# Patient Record
Sex: MALE | Race: WHITE | NOT HISPANIC OR LATINO
[De-identification: names, ages, dates, MRNs, and addresses within clinical notes are randomized per-mention and may not be internally consistent; named-entity substitution may affect disease eponyms.]

---

## 2020-09-25 PROBLEM — Z00.00 ENCOUNTER FOR PREVENTIVE HEALTH EXAMINATION: Status: ACTIVE | Noted: 2020-09-25

## 2020-10-02 ENCOUNTER — NON-APPOINTMENT (OUTPATIENT)
Age: 50
End: 2020-10-02

## 2020-10-02 ENCOUNTER — APPOINTMENT (OUTPATIENT)
Dept: HEART AND VASCULAR | Facility: CLINIC | Age: 50
End: 2020-10-02
Payer: MEDICAID

## 2020-10-02 VITALS
SYSTOLIC BLOOD PRESSURE: 160 MMHG | BODY MASS INDEX: 29.82 KG/M2 | WEIGHT: 225 LBS | RESPIRATION RATE: 14 BRPM | HEIGHT: 73 IN | HEART RATE: 89 BPM | DIASTOLIC BLOOD PRESSURE: 104 MMHG

## 2020-10-02 VITALS — DIASTOLIC BLOOD PRESSURE: 90 MMHG | SYSTOLIC BLOOD PRESSURE: 140 MMHG

## 2020-10-02 DIAGNOSIS — E11.49 TYPE 2 DIABETES MELLITUS WITH OTHER DIABETIC NEUROLOGICAL COMPLICATION: ICD-10-CM

## 2020-10-02 DIAGNOSIS — Z82.49 FAMILY HISTORY OF ISCHEMIC HEART DISEASE AND OTHER DISEASES OF THE CIRCULATORY SYSTEM: ICD-10-CM

## 2020-10-02 PROCEDURE — 93880 EXTRACRANIAL BILAT STUDY: CPT

## 2020-10-02 PROCEDURE — 93306 TTE W/DOPPLER COMPLETE: CPT

## 2020-10-02 PROCEDURE — 99204 OFFICE O/P NEW MOD 45 MIN: CPT

## 2020-10-02 PROCEDURE — 93000 ELECTROCARDIOGRAM COMPLETE: CPT

## 2020-10-02 RX ORDER — METFORMIN HYDROCHLORIDE 500 MG/1
500 TABLET, FILM COATED ORAL
Refills: 0 | Status: DISCONTINUED | COMMUNITY
End: 2020-10-02

## 2020-10-02 NOTE — ASSESSMENT
[FreeTextEntry1] : Assessment \par Lvef 55% Mild dilated aorta 4.2 cm .. dilated sinus of Valsalva\par Carotid neg \par Risk factor modification discussed \par smoking cessation diet / exercise \par Consider use of ACE in light of recent Dx of AODM\par check for proteinuria \par target lean body wgt \par consider adding AS 81 mg if no contraindication

## 2020-10-02 NOTE — HISTORY OF PRESENT ILLNESS
[FreeTextEntry1] : Patient is a 50-year-old white male with past medical history of hypertension elevated triglycerides(1200 recent 300s) and recently diagnosed with diabetes mellitus.  He has been suffering for some time with lower extremity neuropathy which has limited his ambulation.  Patient is also a 1 pack/day smoker.  Significant family history is notable for a family history of thrombophilia with sisters and brothers who have both had DVTs and large pulmonary embolus some of them fatal in nature.  His sister was tested and has factor IV deficiency he had a work-up for thrombophilia which apparently was negative by report.  Patient had a stress test approximately 2 years ago which was negative for ischemic disease.  He presents for follow-up for known history of mitral valve disease as well as an enlarged aorta.  There is no prior history of dissection in the family or aortic aneurysm disease.

## 2020-10-02 NOTE — PHYSICAL EXAM
[General Appearance - Well Developed] : well developed [Normal Appearance] : normal appearance [Well Groomed] : well groomed [General Appearance - Well Nourished] : well nourished [No Deformities] : no deformities [General Appearance - In No Acute Distress] : no acute distress [Normal Conjunctiva] : the conjunctiva exhibited no abnormalities [Eyelids - No Xanthelasma] : the eyelids demonstrated no xanthelasmas [Normal Oral Mucosa] : normal oral mucosa [No Oral Pallor] : no oral pallor [No Oral Cyanosis] : no oral cyanosis [Normal Jugular Venous A Waves Present] : normal jugular venous A waves present [Normal Jugular Venous V Waves Present] : normal jugular venous V waves present [No Jugular Venous Steven A Waves] : no jugular venous steven A waves [Normal Rate] : normal [Rhythm Regular] : regular [Normal S1] : normal S1 [Normal S2] : normal S2 [II] : a grade 2 [Right Carotid Bruit] : right carotid bruit heard [Left Carotid Bruit] : left carotid bruit heard [Respiration, Rhythm And Depth] : normal respiratory rhythm and effort [Exaggerated Use Of Accessory Muscles For Inspiration] : no accessory muscle use [Auscultation Breath Sounds / Voice Sounds] : lungs were clear to auscultation bilaterally [Abdomen Soft] : soft [Abdomen Tenderness] : non-tender [] : no hepato-splenomegaly [Abdomen Mass (___ Cm)] : no abdominal mass palpated [Abnormal Walk] : normal gait [Gait - Sufficient For Exercise Testing] : the gait was sufficient for exercise testing [Click] : no click [Distant] : the heart sounds were ~L not distant

## 2020-11-20 ENCOUNTER — APPOINTMENT (OUTPATIENT)
Dept: DISASTER EMERGENCY | Facility: CLINIC | Age: 50
End: 2020-11-20

## 2020-11-20 DIAGNOSIS — Z01.818 ENCOUNTER FOR OTHER PREPROCEDURAL EXAMINATION: ICD-10-CM

## 2020-11-23 ENCOUNTER — TRANSCRIPTION ENCOUNTER (OUTPATIENT)
Age: 50
End: 2020-11-23

## 2020-11-24 ENCOUNTER — RESULT REVIEW (OUTPATIENT)
Age: 50
End: 2020-11-24

## 2020-11-24 ENCOUNTER — OUTPATIENT (OUTPATIENT)
Dept: OUTPATIENT SERVICES | Facility: HOSPITAL | Age: 50
LOS: 1 days | Discharge: HOME | End: 2020-11-24
Payer: MEDICAID

## 2020-11-24 LAB — GLUCOSE BLDC GLUCOMTR-MCNC: 127 MG/DL — HIGH (ref 70–99)

## 2020-11-24 PROCEDURE — 88304 TISSUE EXAM BY PATHOLOGIST: CPT | Mod: 26

## 2020-11-24 PROCEDURE — 88311 DECALCIFY TISSUE: CPT | Mod: 26

## 2020-11-25 LAB
GRAM STN FLD: SIGNIFICANT CHANGE UP
GRAM STN FLD: SIGNIFICANT CHANGE UP
SPECIMEN SOURCE: SIGNIFICANT CHANGE UP
SPECIMEN SOURCE: SIGNIFICANT CHANGE UP

## 2020-11-27 LAB
-  CEFAZOLIN: SIGNIFICANT CHANGE UP
-  CEFAZOLIN: SIGNIFICANT CHANGE UP
-  CLINDAMYCIN: SIGNIFICANT CHANGE UP
-  CLINDAMYCIN: SIGNIFICANT CHANGE UP
-  ERYTHROMYCIN: SIGNIFICANT CHANGE UP
-  ERYTHROMYCIN: SIGNIFICANT CHANGE UP
-  LINEZOLID: SIGNIFICANT CHANGE UP
-  LINEZOLID: SIGNIFICANT CHANGE UP
-  OXACILLIN: SIGNIFICANT CHANGE UP
-  OXACILLIN: SIGNIFICANT CHANGE UP
-  RIFAMPIN: SIGNIFICANT CHANGE UP
-  RIFAMPIN: SIGNIFICANT CHANGE UP
-  TRIMETHOPRIM/SULFAMETHOXAZOLE: SIGNIFICANT CHANGE UP
-  TRIMETHOPRIM/SULFAMETHOXAZOLE: SIGNIFICANT CHANGE UP
-  VANCOMYCIN: SIGNIFICANT CHANGE UP
-  VANCOMYCIN: SIGNIFICANT CHANGE UP
CULTURE RESULTS: SIGNIFICANT CHANGE UP
CULTURE RESULTS: SIGNIFICANT CHANGE UP
METHOD TYPE: SIGNIFICANT CHANGE UP
METHOD TYPE: SIGNIFICANT CHANGE UP
ORGANISM # SPEC MICROSCOPIC CNT: SIGNIFICANT CHANGE UP
SPECIMEN SOURCE: SIGNIFICANT CHANGE UP
SPECIMEN SOURCE: SIGNIFICANT CHANGE UP

## 2020-12-02 LAB — SURGICAL PATHOLOGY STUDY: SIGNIFICANT CHANGE UP

## 2021-02-21 LAB — SARS-COV-2 N GENE NPH QL NAA+PROBE: NOT DETECTED

## 2021-07-19 ENCOUNTER — TRANSCRIPTION ENCOUNTER (OUTPATIENT)
Age: 51
End: 2021-07-19

## 2021-07-20 ENCOUNTER — RESULT REVIEW (OUTPATIENT)
Age: 51
End: 2021-07-20

## 2021-07-20 ENCOUNTER — OUTPATIENT (OUTPATIENT)
Dept: OUTPATIENT SERVICES | Facility: HOSPITAL | Age: 51
LOS: 1 days | Discharge: HOME | End: 2021-07-20
Payer: MEDICAID

## 2021-07-20 LAB
GLUCOSE BLDC GLUCOMTR-MCNC: 110 MG/DL — HIGH (ref 70–99)
GRAM STN FLD: SIGNIFICANT CHANGE UP
SPECIMEN SOURCE: SIGNIFICANT CHANGE UP

## 2021-07-20 PROCEDURE — 88305 TISSUE EXAM BY PATHOLOGIST: CPT | Mod: 26

## 2021-07-20 PROCEDURE — 88311 DECALCIFY TISSUE: CPT | Mod: 26

## 2021-07-25 LAB
CULTURE RESULTS: NO GROWTH — SIGNIFICANT CHANGE UP
SPECIMEN SOURCE: SIGNIFICANT CHANGE UP

## 2021-07-28 LAB — SURGICAL PATHOLOGY STUDY: SIGNIFICANT CHANGE UP

## 2021-09-23 ENCOUNTER — RESULT REVIEW (OUTPATIENT)
Age: 51
End: 2021-09-23

## 2021-09-23 ENCOUNTER — INPATIENT (INPATIENT)
Facility: HOSPITAL | Age: 51
LOS: 0 days | Discharge: HOME | DRG: 989 | End: 2021-09-23
Attending: EMERGENCY MEDICINE | Admitting: EMERGENCY MEDICINE
Payer: MEDICAID

## 2021-09-23 VITALS
SYSTOLIC BLOOD PRESSURE: 130 MMHG | RESPIRATION RATE: 18 BRPM | HEART RATE: 79 BPM | DIASTOLIC BLOOD PRESSURE: 82 MMHG | TEMPERATURE: 98 F | OXYGEN SATURATION: 96 %

## 2021-09-23 VITALS
OXYGEN SATURATION: 97 % | HEART RATE: 85 BPM | HEIGHT: 73 IN | TEMPERATURE: 98 F | WEIGHT: 214.95 LBS | SYSTOLIC BLOOD PRESSURE: 172 MMHG | DIASTOLIC BLOOD PRESSURE: 85 MMHG | RESPIRATION RATE: 18 BRPM

## 2021-09-23 DIAGNOSIS — Z89.422 ACQUIRED ABSENCE OF OTHER LEFT TOE(S): Chronic | ICD-10-CM

## 2021-09-23 LAB
ALBUMIN SERPL ELPH-MCNC: 4.5 G/DL — SIGNIFICANT CHANGE UP (ref 3.3–5)
ALP SERPL-CCNC: 83 U/L — SIGNIFICANT CHANGE UP (ref 40–120)
ALT FLD-CCNC: 15 U/L — SIGNIFICANT CHANGE UP (ref 10–45)
ANION GAP SERPL CALC-SCNC: 13 MMOL/L — SIGNIFICANT CHANGE UP (ref 5–17)
APTT BLD: 30.1 SEC — SIGNIFICANT CHANGE UP (ref 27.5–35.5)
AST SERPL-CCNC: 14 U/L — SIGNIFICANT CHANGE UP (ref 10–40)
BASOPHILS # BLD AUTO: 0.04 K/UL — SIGNIFICANT CHANGE UP (ref 0–0.2)
BASOPHILS NFR BLD AUTO: 0.7 % — SIGNIFICANT CHANGE UP (ref 0–2)
BILIRUB SERPL-MCNC: 0.4 MG/DL — SIGNIFICANT CHANGE UP (ref 0.2–1.2)
BLD GP AB SCN SERPL QL: NEGATIVE — SIGNIFICANT CHANGE UP
BUN SERPL-MCNC: 11 MG/DL — SIGNIFICANT CHANGE UP (ref 7–23)
CALCIUM SERPL-MCNC: 9.4 MG/DL — SIGNIFICANT CHANGE UP (ref 8.4–10.5)
CHLORIDE SERPL-SCNC: 105 MMOL/L — SIGNIFICANT CHANGE UP (ref 96–108)
CO2 SERPL-SCNC: 21 MMOL/L — LOW (ref 22–31)
CREAT SERPL-MCNC: 0.83 MG/DL — SIGNIFICANT CHANGE UP (ref 0.5–1.3)
EOSINOPHIL # BLD AUTO: 0.09 K/UL — SIGNIFICANT CHANGE UP (ref 0–0.5)
EOSINOPHIL NFR BLD AUTO: 1.5 % — SIGNIFICANT CHANGE UP (ref 0–6)
GLUCOSE SERPL-MCNC: 136 MG/DL — HIGH (ref 70–99)
HCT VFR BLD CALC: 40.8 % — SIGNIFICANT CHANGE UP (ref 39–50)
HGB BLD-MCNC: 13.6 G/DL — SIGNIFICANT CHANGE UP (ref 13–17)
IMM GRANULOCYTES NFR BLD AUTO: 0.3 % — SIGNIFICANT CHANGE UP (ref 0–1.5)
INR BLD: 0.84 — LOW (ref 0.88–1.16)
LYMPHOCYTES # BLD AUTO: 1.66 K/UL — SIGNIFICANT CHANGE UP (ref 1–3.3)
LYMPHOCYTES # BLD AUTO: 27.8 % — SIGNIFICANT CHANGE UP (ref 13–44)
MCHC RBC-ENTMCNC: 30.6 PG — SIGNIFICANT CHANGE UP (ref 27–34)
MCHC RBC-ENTMCNC: 33.3 GM/DL — SIGNIFICANT CHANGE UP (ref 32–36)
MCV RBC AUTO: 91.7 FL — SIGNIFICANT CHANGE UP (ref 80–100)
MONOCYTES # BLD AUTO: 0.44 K/UL — SIGNIFICANT CHANGE UP (ref 0–0.9)
MONOCYTES NFR BLD AUTO: 7.4 % — SIGNIFICANT CHANGE UP (ref 2–14)
NEUTROPHILS # BLD AUTO: 3.73 K/UL — SIGNIFICANT CHANGE UP (ref 1.8–7.4)
NEUTROPHILS NFR BLD AUTO: 62.3 % — SIGNIFICANT CHANGE UP (ref 43–77)
NRBC # BLD: 0 /100 WBCS — SIGNIFICANT CHANGE UP (ref 0–0)
PLATELET # BLD AUTO: 184 K/UL — SIGNIFICANT CHANGE UP (ref 150–400)
POTASSIUM SERPL-MCNC: 4 MMOL/L — SIGNIFICANT CHANGE UP (ref 3.5–5.3)
POTASSIUM SERPL-SCNC: 4 MMOL/L — SIGNIFICANT CHANGE UP (ref 3.5–5.3)
PROT SERPL-MCNC: 7.3 G/DL — SIGNIFICANT CHANGE UP (ref 6–8.3)
PROTHROM AB SERPL-ACNC: 10.2 SEC — LOW (ref 10.6–13.6)
RBC # BLD: 4.45 M/UL — SIGNIFICANT CHANGE UP (ref 4.2–5.8)
RBC # FLD: 13.7 % — SIGNIFICANT CHANGE UP (ref 10.3–14.5)
RH IG SCN BLD-IMP: POSITIVE — SIGNIFICANT CHANGE UP
RH IG SCN BLD-IMP: POSITIVE — SIGNIFICANT CHANGE UP
SARS-COV-2 RNA SPEC QL NAA+PROBE: NEGATIVE — SIGNIFICANT CHANGE UP
SODIUM SERPL-SCNC: 139 MMOL/L — SIGNIFICANT CHANGE UP (ref 135–145)
WBC # BLD: 5.98 K/UL — SIGNIFICANT CHANGE UP (ref 3.8–10.5)
WBC # FLD AUTO: 5.98 K/UL — SIGNIFICANT CHANGE UP (ref 3.8–10.5)

## 2021-09-23 PROCEDURE — 73719 MRI LOWER EXTREMITY W/DYE: CPT | Mod: ME

## 2021-09-23 PROCEDURE — 73719 MRI LOWER EXTREMITY W/DYE: CPT | Mod: 26,LT

## 2021-09-23 PROCEDURE — 88304 TISSUE EXAM BY PATHOLOGIST: CPT

## 2021-09-23 PROCEDURE — 87075 CULTR BACTERIA EXCEPT BLOOD: CPT

## 2021-09-23 PROCEDURE — 86900 BLOOD TYPING SEROLOGIC ABO: CPT

## 2021-09-23 PROCEDURE — G1004: CPT

## 2021-09-23 PROCEDURE — 86140 C-REACTIVE PROTEIN: CPT

## 2021-09-23 PROCEDURE — A9585: CPT

## 2021-09-23 PROCEDURE — 99285 EMERGENCY DEPT VISIT HI MDM: CPT | Mod: 25

## 2021-09-23 PROCEDURE — 80053 COMPREHEN METABOLIC PANEL: CPT

## 2021-09-23 PROCEDURE — 87070 CULTURE OTHR SPECIMN AEROBIC: CPT

## 2021-09-23 PROCEDURE — 85652 RBC SED RATE AUTOMATED: CPT

## 2021-09-23 PROCEDURE — 88304 TISSUE EXAM BY PATHOLOGIST: CPT | Mod: 26

## 2021-09-23 PROCEDURE — 73630 X-RAY EXAM OF FOOT: CPT

## 2021-09-23 PROCEDURE — 87635 SARS-COV-2 COVID-19 AMP PRB: CPT

## 2021-09-23 PROCEDURE — 73630 X-RAY EXAM OF FOOT: CPT | Mod: 26

## 2021-09-23 PROCEDURE — 88311 DECALCIFY TISSUE: CPT | Mod: 26

## 2021-09-23 PROCEDURE — 86901 BLOOD TYPING SEROLOGIC RH(D): CPT

## 2021-09-23 PROCEDURE — 86850 RBC ANTIBODY SCREEN: CPT

## 2021-09-23 PROCEDURE — 73630 X-RAY EXAM OF FOOT: CPT | Mod: 26,LT

## 2021-09-23 PROCEDURE — 85730 THROMBOPLASTIN TIME PARTIAL: CPT

## 2021-09-23 PROCEDURE — 96374 THER/PROPH/DIAG INJ IV PUSH: CPT | Mod: XU

## 2021-09-23 PROCEDURE — 36415 COLL VENOUS BLD VENIPUNCTURE: CPT

## 2021-09-23 PROCEDURE — 88311 DECALCIFY TISSUE: CPT

## 2021-09-23 PROCEDURE — 85025 COMPLETE CBC W/AUTO DIFF WBC: CPT

## 2021-09-23 PROCEDURE — 85610 PROTHROMBIN TIME: CPT

## 2021-09-23 RX ORDER — VANCOMYCIN HCL 1 G
1500 VIAL (EA) INTRAVENOUS ONCE
Refills: 0 | Status: COMPLETED | OUTPATIENT
Start: 2021-09-23 | End: 2021-09-23

## 2021-09-23 RX ORDER — MUPIROCIN 20 MG/G
1 OINTMENT TOPICAL
Qty: 1 | Refills: 3
Start: 2021-09-23 | End: 2022-01-20

## 2021-09-23 RX ORDER — LIDOCAINE HCL 20 MG/ML
20 VIAL (ML) INJECTION ONCE
Refills: 0 | Status: COMPLETED | OUTPATIENT
Start: 2021-09-23 | End: 2021-09-23

## 2021-09-23 RX ORDER — AZTREONAM 2 G
1 VIAL (EA) INJECTION
Qty: 20 | Refills: 0
Start: 2021-09-23 | End: 2021-10-02

## 2021-09-23 RX ORDER — VANCOMYCIN HCL 1 G
1000 VIAL (EA) INTRAVENOUS ONCE
Refills: 0 | Status: DISCONTINUED | OUTPATIENT
Start: 2021-09-23 | End: 2021-09-23

## 2021-09-23 RX ADMIN — Medication 20 MILLILITER(S): at 11:52

## 2021-09-23 RX ADMIN — Medication 300 MILLIGRAM(S): at 12:46

## 2021-09-23 NOTE — ED PROVIDER NOTE - OBJECTIVE STATEMENT
52 y/o M PMHx DM, left foot distal phalanx third toe osteomyelitis s/p partial amputation of the left third toe with proximal margin. Sent in by Dr. Fine (podiatrist) for left digit imaging study consistent with osteomyelitis. 52 y/o M PMHx DM, left foot distal phalanx third toe osteomyelitis s/p partial amputation of the left third toe with proximal margin. Sent in by Dr. Fine (podiatrist) for left 4th digit imaging study consistent with osteomyelitis. Pt notes yellow discharge from the toe at home. 50 y/o M PMHx DM, left foot distal phalanx third toe osteomyelitis s/p partial amputation of the left third toe with proximal margin. Sent in by Dr. Fine (podiatrist) for left 4th digit imaging study consistent with osteomyelitis. Pt notes yellow discharge from the toe at home.  Pt is vaccinated for Covid as of April 2021 with Pfizer.

## 2021-09-23 NOTE — ED PROVIDER NOTE - PATIENT PORTAL LINK FT
You can access the FollowMyHealth Patient Portal offered by [facility] by registering at the following website: [email or web address]. By joining [facility]’s FollowMyHealth portal, you will also be able to view your health information using other applications (apps) compatible with our system.

## 2021-09-23 NOTE — CONSULT NOTE ADULT - ASSESSMENT
50yo M w/ PMHx B12 deficiency c/b neuropathy, HTN, HLD, DM Type 2 (diagnosed August 2021, last known 7/2021 Hgb A1c 5.5%), left foot osteomyelitis s/p amputation of distal L 2nd toe (11/2020) and amputation of distal L 3rd toe (07/2021) sent to [facility] ER by podiatrist Dr. Nae Bentley to r/o osteomyelitis of left 4th digit wound.     P:   Admit to Medicine for IV abx + r/o OM via MRI   L foot XR evaluated; unremarkable for signs consistent w/ OM   Ordered L foot MRI w/ contrast   Injected 8cc of 1% lidocaine plain in a digital block fashion. Small 0.5cm incision made to distal tip of L 4th digit to allow for passing of Jamshidi needle to obtain bone biopsy; specimen sent to microbiology and pathology. Pt tolerated bedside procedure w/o AE.   Dry sterile dressing to L foot   Start Vancomycin IV; can de-escalate pending results of bone cx & path       Podiatry following  50yo M w/ PMHx B12 deficiency c/b neuropathy, HTN, HLD, DM Type 2 (diagnosed August 2021, last known 7/2021 Hgb A1c 5.5%), left foot osteomyelitis s/p amputation of distal L 2nd toe (11/2020) and amputation of distal L 3rd toe (07/2021) sent to [facility] ER by podiatrist Dr. Nae Bentley to r/o osteomyelitis of left 4th digit wound.     P:   Admit to Medicine for IV abx + r/o OM via MRI   L foot XR evaluated; unremarkable for signs consistent w/ OM   Ordered L foot MRI w/ contrast   Injected 8cc of 1% lidocaine plain in a digital block fashion. Small 0.5cm incision made to distal tip of L 4th digit (dorsal incision in relation to plantar wound) to allow for passing of Jamshidi needle to obtain bone biopsy; specimen sent to microbiology and pathology. Pt tolerated bedside procedure w/o AE.   Dry sterile dressing to L foot   Start Vancomycin IV; can de-escalate pending results of bone cx & path       Podiatry following  52yo M w/ PMHx B12 deficiency c/b neuropathy, HTN, HLD, DM Type 2 (diagnosed August 2021, last known 7/2021 Hgb A1c 5.5%), left foot osteomyelitis s/p amputation of distal L 2nd toe (11/2020) and amputation of distal L 3rd toe (07/2021) sent to [facility] ER by podiatrist Dr. Nae Bentley to r/o osteomyelitis of left 4th digit wound.     P:   Admit to Medicine for IV abx + r/o OM via MRI   L foot XR evaluated; unremarkable for signs consistent w/ OM   Ordered L foot MRI w/ contrast   Injected 8cc of 1% lidocaine plain in a digital block fashion. Small 0.5cm incision made to distal tip of L 4th digit (dorsal incision in relation to plantar wound) to allow for passing of Jamshidi needle to obtain bone biopsy; specimen sent to microbiology and pathology. Pt tolerated bedside procedure w/o AE.   As L 4th digit wound is superficial, no deep wound cx obtained.   Dry sterile dressing to L foot   Start Vancomycin IV; can de-escalate pending results of bone cx & path       Podiatry following  50yo M w/ PMHx B12 deficiency c/b neuropathy, HTN, HLD, DM Type 2 (diagnosed August 2021, last known 7/2021 Hgb A1c 5.5%), left foot osteomyelitis s/p amputation of distal L 2nd toe (11/2020) and amputation of distal L 3rd toe (07/2021) sent to [facility] ER by podiatrist Dr. Nae Bentley to r/o osteomyelitis of left 4th digit wound.     P:   Does not warrant admission at this time   L foot XR evaluated; unremarkable for signs consistent w/ OM   Recommend getting a L foot MRI outpatient   Injected 8cc of 1% lidocaine plain in a digital block fashion. Small 0.5cm incision made to distal tip of L 4th digit (dorsal incision in relation to plantar wound) to allow for passing of Jamshidi needle to obtain bone biopsy; specimen sent to microbiology and pathology. Pt tolerated bedside procedure w/o AE.   As L 4th digit wound is superficial, no deep wound cx obtained.   Will f/u microbiology and pathology results of L 4th digit bone   Dry sterile dressing to L foot   RX: PO Bactrim DS 1 tablet BID for 10 days   RX : Mupirocin topical ointment to be applied daily to wound on L 4th digit   F/u w/ Dr. Nae Bentley on Friday 10/1/21 at 1:30PM        50yo M w/ PMHx B12 deficiency c/b neuropathy, HTN, HLD, DM Type 2 (diagnosed August 2021, last known 7/2021 Hgb A1c 5.5%), left foot osteomyelitis s/p amputation of distal L 2nd toe (11/2020) and amputation of distal L 3rd toe (07/2021) sent to [facility] ER by podiatrist Dr. Nae Bentley to r/o osteomyelitis of left 4th digit wound.     P:   Does not warrant admission at this time   L foot XR evaluated; unremarkable for signs consistent w/ OM   Recommend getting a L foot MRI outpatient - *will try to get MRI done while in ED; can be discharged after MRI is performed. Podiatry will f/u results as outpatient*  Injected 8cc of 1% lidocaine plain in a digital block fashion. Small 0.5cm incision made to distal tip of L 4th digit (dorsal incision in relation to plantar wound) to allow for passing of Jamshidi needle to obtain bone biopsy; specimen sent to microbiology and pathology. Pt tolerated bedside procedure w/o AE.   As L 4th digit wound is superficial, no deep wound cx obtained.   Will f/u microbiology and pathology results of L 4th digit bone   Dry sterile dressing to L foot   RX: PO Bactrim DS 1 tablet BID for 10 days   RX : Mupirocin topical ointment to be applied daily to wound on L 4th digit     Please follow-up in outpatient podiatry clinic with Dr. Nae Bentley on Friday 10/1 @ 1:30PM:   [facility]  [address] [place] [address], [address] [place] [address]  [phone]

## 2021-09-23 NOTE — ED PROVIDER NOTE - MUSCULOSKELETAL, MLM
Spine appears normal, range of motion is not limited, no muscle or joint tenderness Spine appears normal, range of motion is not limited, no muscle or joint tenderness. 1cm x 1cm ulcer noted to the left 4th digit. Podiatry resident changed cleaned and changed dressing. Rest of foot exam deferred to podiatry.

## 2021-09-23 NOTE — ED PROVIDER NOTE - CLINICAL SUMMARY MEDICAL DECISION MAKING FREE TEXT BOX
50 y/o M PMHx DM, left foot distal phalanx third toe osteomyelitis s/p partial amputation of the left third toe with proximal margin. Sent to ED by podiatrist Dr. Fine for evaluation of osteomyelitis of the left 3rd digit. Dr. Fine will see Pt at bedside in ED. Will start Pt on IV abx. Anticipate admission. 50 y/o M PMHx DM, left foot distal phalanx third toe osteomyelitis s/p partial amputation of the left third toe with proximal margin. Sent to ED by podiatrist Dr. Fine for evaluation of osteomyelitis of the left 4rd digit found in outpatient imaging. Dr. Fine will see Pt at bedside in ED. Will start Pt on IV abx. Anticipate admission. 52 y/o M PMHx DM, left foot distal phalanx third toe osteomyelitis s/p partial amputation of the left third toe with proximal margin. Sent to ED by podiatrist Dr. Fine for evaluation of osteomyelitis of the left 4rd digit found in outpatient imaging. Dr. Fine will see Pt at bedside in ED. Per Podiatry, Pt to be admitted for biopsy of the left 4th digit bone. Will start on IV Vancomycin. 52 y/o M PMHx DM, left foot distal phalanx third toe osteomyelitis s/p partial amputation of the left third toe with proximal margin. Sent to ED by podiatrist Dr. Fine for evaluation of osteomyelitis of the left 4rd digit found in outpatient imaging. Dr. Fine will see Pt at bedside in ED. Per Podiatry, Pt to be admitted for biopsy of the left 4th digit bone. Will start on IV Vancomycin. Wiil require inpatient

## 2021-09-23 NOTE — CONSULT NOTE ADULT - SUBJECTIVE AND OBJECTIVE BOX
Attending: Norris Gandhi DPM     Patient is a 51y old  Male who presents with a chief complaint of L 4th digit wound     HPI: 52yo M w/ PMHx B12 deficiency c/b neuropathy, HTN, HLD, DM Type 2 (diagnosed August 2021, last known 7/2021 Hgb A1c 5.5%), left foot osteomyelitis s/p amputation of distal L 2nd toe (11/2020) and amputation of distal L 3rd toe (07/2021) sent to [facility] ER by podiatrist Dr. Nae Bentley to r/o osteomyelitis of left 4th digit wound. Pt states that wound has been present for approx 3-4 weeks. Goes to [facility] podiatry clinic in [place] every week to have the wound evaluated + debrided. Endorses occasional "yellowish" drainage visible on bandage. Pt cleanses foot daily, applied mupirocin, covered by a dry dressing. Pt has been using the surgical shoe on the left foot for the past week; uses cane as well.       Review of systems negative except per HPI and as stated below  General:	 no weakness; no fevers, no chills  Skin/Breast: no rash  Respiratory and Thorax: no SOB, no cough  Cardiovascular:	No chest pain  Gastrointestinal:	 no nausea, vomiting , diarrhea  Genitourinary:	no dysuria, no difficulty urinating, no hematuria  Musculoskeletal:	no weakness, no joint swelling/pain  Neurological:	no focal weakness/numbness  Endocrine:	no polyuria, no polydipsia    PAST MEDICAL & SURGICAL HISTORY:    Home Medications:    Allergies    No Known Allergies    Intolerances      FAMILY HISTORY:    Social History:       LABS                        13.6   5.98  )-----------( 184      ( 23 Sep 2021 10:58 )             40.8     09-23    139  |  105  |  11  ----------------------------<  136<H>  4.0   |  21<L>  |  0.83    Ca    9.4      23 Sep 2021 10:58    TPro  7.3  /  Alb  4.5  /  TBili  0.4  /  DBili  x   /  AST  14  /  ALT  15  /  AlkPhos  83  09-23    PT/INR - ( 23 Sep 2021 10:58 )   PT: 10.2 sec;   INR: 0.84          PTT - ( 23 Sep 2021 10:58 )  PTT:30.1 sec    Vital Signs Last 24 Hrs  T(C): 36.4 (23 Sep 2021 12:18), Max: 36.9 (23 Sep 2021 10:11)  T(F): 97.5 (23 Sep 2021 12:18), Max: 98.5 (23 Sep 2021 10:11)  HR: 80 (23 Sep 2021 12:18) (80 - 85)  BP: 130/73 (23 Sep 2021 12:18) (130/73 - 172/85)  BP(mean): --  RR: 18 (23 Sep 2021 12:18) (18 - 18)  SpO2: 96% (23 Sep 2021 12:18) (96% - 97%)    PHYSICAL EXAM  General: NAD, AA0x3    Lower Extremity Focused:  Vasc:  Derm:  Neuro:  MSK:     RADIOLOGY                       Attending: Norris Gandhi DPM     Patient is a 51y old  Male who presents with a chief complaint of L 4th digit wound     HPI: 52yo M w/ PMHx B12 deficiency c/b neuropathy, HTN, HLD, DM Type 2 (diagnosed August 2021, last known 7/2021 Hgb A1c 5.5%), left foot osteomyelitis s/p amputation of distal L 2nd toe (11/2020) and amputation of distal L 3rd toe (07/2021) sent to [facility] ER by podiatrist Dr. Nae Bentley to r/o osteomyelitis of left 4th digit wound. Pt states that wound has been present for approx 3-4 weeks. Goes to [facility] podiatry clinic in [place] every week to have the wound evaluated + debrided. Endorses occasional "yellowish" drainage visible on bandage. Pt cleanses foot daily, applied mupirocin, covered by a dry dressing. Pt has been using the surgical shoe on the left foot for the past week; uses cane as well.       Review of systems negative except per HPI and as stated below  General:	 no weakness; no fevers, no chills  Skin/Breast: no rash  Respiratory and Thorax: no SOB, no cough  Cardiovascular:	No chest pain  Gastrointestinal:	 no nausea, vomiting , diarrhea  Genitourinary:	no dysuria, no difficulty urinating, no hematuria  Musculoskeletal:	no weakness, no joint swelling/pain  Neurological:	no focal weakness/numbness  Endocrine:	no polyuria, no polydipsia    PAST MEDICAL & SURGICAL HISTORY:    Home Medications:    Allergies    No Known Allergies    Intolerances        LABS                        13.6   5.98  )-----------( 184      ( 23 Sep 2021 10:58 )             40.8     09-23    139  |  105  |  11  ----------------------------<  136<H>  4.0   |  21<L>  |  0.83    Ca    9.4      23 Sep 2021 10:58    TPro  7.3  /  Alb  4.5  /  TBili  0.4  /  DBili  x   /  AST  14  /  ALT  15  /  AlkPhos  83  09-23    PT/INR - ( 23 Sep 2021 10:58 )   PT: 10.2 sec;   INR: 0.84          PTT - ( 23 Sep 2021 10:58 )  PTT:30.1 sec    Vital Signs Last 24 Hrs  T(C): 36.4 (23 Sep 2021 12:18), Max: 36.9 (23 Sep 2021 10:11)  T(F): 97.5 (23 Sep 2021 12:18), Max: 98.5 (23 Sep 2021 10:11)  HR: 80 (23 Sep 2021 12:18) (80 - 85)  BP: 130/73 (23 Sep 2021 12:18) (130/73 - 172/85)  BP(mean): --  RR: 18 (23 Sep 2021 12:18) (18 - 18)  SpO2: 96% (23 Sep 2021 12:18) (96% - 97%)    PHYSICAL EXAM  General: NAD, AA0x3    Lower Extremity Focused:  Vasc: DP/PT 2/4 b/l. WWP. TG warm to cool. No edema. Minimal erythema to dorsal surface of left 4th digit.   Derm: Rendon Grade 1 ulceration to distal tip of contracted left 4th digit, measures approx 1cm x 1cm x 0.1cm depth. Does not probe to bone. No drainage/purulence expressed. No open wounds/abrasions present to R foot.   Neuro: Protective sensation diminished 2/2 B12 deficiency & DM Type 2  MSK: 5/5 muscle strength in all crural compartments b/l. Hammertoe deformity of L 4th and 5th digits. Distal ends of L 2nd and 3rd digits amputated. Ambulates independently w/ assistance of cane.     RADIOLOGY  < from: Xray Foot AP + Lateral + Oblique, Left (09.23.21 @ 11:40) >  FINDINGS:    There is no fracture or dislocation. Patient is status post partial indentation of the second and third rays at the level of the middle phalangeal heads. Normal appearance of the stump margins, without evidence for osteomyelitis.  There is no focal soft tissue abnormality.      IMPRESSION:    No radiographic evidence for acute osteomyelitis.    < end of copied text >                       Attending: Norris Gandhi DPM     Patient is a 51y old  Male who presents with a chief complaint of L 4th digit wound     HPI: 50yo M w/ PMHx B12 deficiency c/b neuropathy, cataract surgery, HTN, HLD, DM Type 2 (diagnosed August 2021, last known 7/2021 Hgb A1c 5.5%), left foot osteomyelitis s/p amputation of distal L 2nd toe (11/2020) and amputation of distal L 3rd toe (07/2021) sent to [facility] ER by podiatrist Dr. Nae Bentley to r/o osteomyelitis of left 4th digit wound. Pt states that wound has been present for approx 3-4 weeks. Goes to [facility] podiatry clinic in [place] every week to have the wound evaluated + debrided. Endorses occasional "yellowish" drainage visible on bandage. Pt cleanses foot daily, applied mupirocin, covered by a dry dressing. Pt has been using the surgical shoe on the left foot for the past week; uses cane as well. Denies F/N/V/C/CP/SOB.       Review of systems negative except per HPI and as stated below  General:	 no weakness; no fevers, no chills  Skin/Breast: no rash  Respiratory and Thorax: no SOB, no cough  Cardiovascular:	No chest pain  Gastrointestinal:	 no nausea, vomiting , diarrhea  Genitourinary:	no dysuria, no difficulty urinating, no hematuria  Musculoskeletal:	no weakness, no joint swelling/pain  Neurological:	no focal weakness/numbness  Endocrine:	no polyuria, no polydipsia      LABS                        13.6   5.98  )-----------( 184      ( 23 Sep 2021 10:58 )             40.8     09-23    139  |  105  |  11  ----------------------------<  136<H>  4.0   |  21<L>  |  0.83    Ca    9.4      23 Sep 2021 10:58    TPro  7.3  /  Alb  4.5  /  TBili  0.4  /  DBili  x   /  AST  14  /  ALT  15  /  AlkPhos  83  09-23    PT/INR - ( 23 Sep 2021 10:58 )   PT: 10.2 sec;   INR: 0.84          PTT - ( 23 Sep 2021 10:58 )  PTT:30.1 sec    Vital Signs Last 24 Hrs  T(C): 36.4 (23 Sep 2021 12:18), Max: 36.9 (23 Sep 2021 10:11)  T(F): 97.5 (23 Sep 2021 12:18), Max: 98.5 (23 Sep 2021 10:11)  HR: 80 (23 Sep 2021 12:18) (80 - 85)  BP: 130/73 (23 Sep 2021 12:18) (130/73 - 172/85)  BP(mean): --  RR: 18 (23 Sep 2021 12:18) (18 - 18)  SpO2: 96% (23 Sep 2021 12:18) (96% - 97%)    PHYSICAL EXAM  General: NAD, AA0x3    Lower Extremity Focused:  Vasc: DP/PT 2/4 b/l. WWP. TG warm to cool. No edema. Minimal erythema to dorsal surface of left 4th digit.   Derm: Rendon Grade 1 ulceration to distal tip of contracted left 4th digit, measures approx 1cm x 1cm x 0.1cm depth. Does not probe to bone. No drainage/purulence expressed. No open wounds/abrasions present to R foot.   Neuro: Protective sensation diminished 2/2 B12 deficiency & DM Type 2  MSK: 5/5 muscle strength in all crural compartments b/l. Hammertoe deformity of L 4th and 5th digits. Distal ends of L 2nd and 3rd digits amputated. Ambulates independently w/ assistance of cane.     RADIOLOGY  < from: Xray Foot AP + Lateral + Oblique, Left (09.23.21 @ 11:40) >  FINDINGS:    There is no fracture or dislocation. Patient is status post partial indentation of the second and third rays at the level of the middle phalangeal heads. Normal appearance of the stump margins, without evidence for osteomyelitis.  There is no focal soft tissue abnormality.      IMPRESSION:    No radiographic evidence for acute osteomyelitis.    < end of copied text >

## 2021-09-23 NOTE — ED PROVIDER NOTE - NSFOLLOWUPINSTRUCTIONS_ED_ALL_ED_FT
Osteomyelitis, Adult       Bone infections, also called osteomyelitis,occur when bacteria or other germs get inside a bone. This can happen if you have an infection in another part of your body that spreads through your blood. It can also happen if you have a wound or a broken bone (fracture) that breaks the skin. A wound or a fracture can allow germs from your skin or from outside of your body to spread to your bone.  Bone infections need to be treated quickly to:  •Prevent bone damage.      •Prevent the infection from spreading to other areas of your body.        What are the causes?    Most bone infections are caused by bacteria. The most common bacteria is one found on the skin (staphylococcus). Bone infections can also be caused by other germs, such as viruses and funguses.      What increases the risk?  You are more likely to develop this condition if:  •You recently had surgery, especially bone or joint surgery.      •You have had an injury, such as stepping on a nail or having a fracture that exposes bones through the skin.    •You have a long-term (chronic) disease, such as:  •Diabetes.      •HIV (human immunodeficiency virus).      •Rheumatoid arthritis.      •Sickle cell anemia.      •Kidney disease that requires dialysis.        •You have a condition that affects your body's defense system (immune system) or you take medicines that block or weaken the immune system.      •You have a condition that reduces your blood flow.      •You have an artificial joint.      •You have had a joint or bone repaired with plates or screws.      •You use IV drugs.        What are the signs or symptoms?  Symptoms vary depending on the type and location of your infection. Common symptoms of bone infections include:  •Fever and chills.      •Skin redness and warmth.      •Swelling.      •Pain and stiffness.      •Drainage of fluid or pus near the infection.        How is this diagnosed?  This condition may be diagnosed based on:  •Your symptoms and medical history.      •A physical exam.    •Tests, such as:  •A sample of tissue, fluid, or blood taken to be examined under a microscope.      •Pus or discharge swabbed from a wound for testing. This is to identify the type of germs and to determine what type of medicine will kill them.      •Blood tests.        •Imaging studies, including X-rays, MRI, CT scan, bone scan, or ultrasound.        How is this treated?    Treatment for this condition depends on the cause and type of infection. Antibiotic medicines are usually the first treatment for a bone infection. This may be done in a hospital at first. You may have to continue IV antibiotics at home or take antibiotics by mouth for several weeks after that.  Other treatments may include surgery to:  •Remove dead or dying tissue from a bone.      •Remove an infected artificial joint.      •Remove infected plates or screws that were used to repair a broken bone.        Follow these instructions at home:    Medicines     •Take over-the-counter and prescription medicines as told by your health care provider. Finish all antibiotic medicine even if you start to feel better.      •Follow instructions from your health care provider about how to take IV antibiotics at home. You may need to have a nurse come to your home to give you the IV antibiotics.        Managing pain, stiffness, and swelling   If directed, put ice on the affected area. To do this:  •Put ice in a plastic bag.      •Place a towel between your skin and the bag.      •Leave the ice on for 20 minutes, 2–3 times a day.      General instructions     •Ask your health care provider if you have any restrictions on your activities.      • Do not use any products that contain nicotine or tobacco, such as cigarettes, e-cigarettes, and chewing tobacco. If you need help quitting, ask your health care provider.      •Keep all follow-up visits as told by your health care provider. This is important.        How is this prevented?    •Wash your hands often to stop the spread of germs. Wash your hands for at least 20 seconds with soap and water. If soap and water are not available, use hand .      •Keep any open areas, cuts, or wounds clean. Apply a clean bandage after cleaning the area.      •Check wounds frequently for signs of infections. Signs of infection include redness, swelling, warmth, pus, or a bad smell.      •Wear proper footwear to avoid injuries to the feet.        Contact a health care provider if:    •You develop a fever or chills.      •You have redness, warmth, pain, or swelling that returns after treatment.        Get help right away if:    •You have rapid breathing or you have trouble breathing.      •You have chest pain.      •You cannot drink fluids or make urine.      •The affected area swells, changes color, or turns blue.      •You have numbness or severe pain in the affected area.      These symptoms may represent a serious problem that is an emergency. Do not wait to see if the symptoms will go away. Get medical help right away. Call your local emergency services (911 in the [place] Do not drive yourself to the hospital.       Summary    •Bone infections, also called osteomyelitis,occur when bacteria or other germs get inside a bone.      •You are more likely to get this type of infection if you have a condition that lowers your ability to fight infections. You are also likely to get this condition if you take medicines that block or weaken the immune system.      •Most bone infections are caused by bacteria. They can also be caused by other germs, such as viruses and funguses.      •Treatment for this condition usually starts with taking antibiotics. Further treatment depends on the cause and type of infection.      This information is not intended to replace advice given to you by your health care provider. Make sure you discuss any questions you have with your health care provider.

## 2021-09-23 NOTE — ED ADULT NURSE NOTE - NSIMPLEMENTINTERV_GEN_ALL_ED
Implemented All Fall Risk Interventions:  [place] to call system. Call bell, personal items and telephone within reach. Instruct patient to call for assistance. Room bathroom lighting operational. Non-slip footwear when patient is off stretcher. Physically safe environment: no spills, clutter or unnecessary equipment. Stretcher in lowest position, wheels locked, appropriate side rails in place. Provide visual cue, wrist band, yellow gown, etc. Monitor gait and stability. Monitor for mental status changes and reorient to person, place, and time. Review medications for side effects contributing to fall risk. Reinforce activity limits and safety measures with patient and family.

## 2021-09-23 NOTE — ED ADULT NURSE NOTE - OBJECTIVE STATEMENT
pt presented to the ED from home complaining of toe pain/ infection. pt denies fever or chills. told by podiatrist to come for further evaluation. pt ambulates with a boot and straight cane. labs drawn, X ray completed, Family at the bedside. pt makes his needs known.

## 2021-09-24 LAB — SURGICAL PATHOLOGY STUDY: SIGNIFICANT CHANGE UP

## 2021-09-28 DIAGNOSIS — Z89.422 ACQUIRED ABSENCE OF OTHER LEFT TOE(S): ICD-10-CM

## 2021-09-28 DIAGNOSIS — M79.675 PAIN IN LEFT TOE(S): ICD-10-CM

## 2021-09-28 DIAGNOSIS — E11.621 TYPE 2 DIABETES MELLITUS WITH FOOT ULCER: ICD-10-CM

## 2021-09-28 DIAGNOSIS — L97.529 NON-PRESSURE CHRONIC ULCER OF OTHER PART OF LEFT FOOT WITH UNSPECIFIED SEVERITY: ICD-10-CM

## 2021-09-28 DIAGNOSIS — I10 ESSENTIAL (PRIMARY) HYPERTENSION: ICD-10-CM

## 2021-09-28 DIAGNOSIS — E53.8 DEFICIENCY OF OTHER SPECIFIED B GROUP VITAMINS: ICD-10-CM

## 2021-09-28 DIAGNOSIS — E11.9 TYPE 2 DIABETES MELLITUS WITHOUT COMPLICATIONS: ICD-10-CM

## 2021-11-08 PROBLEM — M86.9 OSTEOMYELITIS, UNSPECIFIED: Chronic | Status: ACTIVE | Noted: 2021-09-23

## 2021-11-10 DIAGNOSIS — R09.89 OTHER SPECIFIED SYMPTOMS AND SIGNS INVOLVING THE CIRCULATORY AND RESPIRATORY SYSTEMS: ICD-10-CM

## 2021-11-11 ENCOUNTER — NON-APPOINTMENT (OUTPATIENT)
Age: 51
End: 2021-11-11

## 2021-11-11 ENCOUNTER — APPOINTMENT (OUTPATIENT)
Dept: HEART AND VASCULAR | Facility: CLINIC | Age: 51
End: 2021-11-11
Payer: MEDICAID

## 2021-11-11 VITALS
SYSTOLIC BLOOD PRESSURE: 132 MMHG | BODY MASS INDEX: 29.03 KG/M2 | WEIGHT: 219 LBS | HEART RATE: 99 BPM | DIASTOLIC BLOOD PRESSURE: 90 MMHG | HEIGHT: 73 IN

## 2021-11-11 PROCEDURE — 93000 ELECTROCARDIOGRAM COMPLETE: CPT

## 2021-11-11 PROCEDURE — ZZZZZ: CPT

## 2021-11-11 PROCEDURE — 99214 OFFICE O/P EST MOD 30 MIN: CPT | Mod: 25

## 2021-11-11 PROCEDURE — 93306 TTE W/DOPPLER COMPLETE: CPT

## 2021-11-14 NOTE — HISTORY OF PRESENT ILLNESS
[FreeTextEntry1] : 10/2/2020     Patient is a 50-year-old white male with past medical history of hypertension elevated triglycerides(1200 recent 300s) and recently diagnosed with diabetes mellitus.  He has been suffering for some time with lower extremity neuropathy which has limited his ambulation.  Patient is also a 1 pack/day smoker.  Significant family history is notable for a family history of thrombophilia with sisters and brothers who have both had DVTs and large pulmonary embolus some of them fatal in nature.  His sister was tested and has factor IV deficiency he had a work-up for thrombophilia which apparently was negative by report.  Patient had a stress test approximately 2 years ago which was negative for ischemic disease.  He presents for follow-up for known history of mitral valve disease as well as an enlarged aorta.  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

## 2023-04-25 DIAGNOSIS — I34.0 NONRHEUMATIC MITRAL (VALVE) INSUFFICIENCY: ICD-10-CM

## 2023-05-04 ENCOUNTER — APPOINTMENT (OUTPATIENT)
Dept: HEART AND VASCULAR | Facility: CLINIC | Age: 53
End: 2023-05-04
Payer: MEDICAID

## 2023-05-04 ENCOUNTER — NON-APPOINTMENT (OUTPATIENT)
Age: 53
End: 2023-05-04

## 2023-05-04 VITALS
SYSTOLIC BLOOD PRESSURE: 138 MMHG | RESPIRATION RATE: 14 BRPM | HEIGHT: 73 IN | WEIGHT: 217 LBS | HEART RATE: 78 BPM | BODY MASS INDEX: 28.76 KG/M2 | DIASTOLIC BLOOD PRESSURE: 95 MMHG

## 2023-05-04 DIAGNOSIS — I77.810 THORACIC AORTIC ECTASIA: ICD-10-CM

## 2023-05-04 DIAGNOSIS — F17.210 NICOTINE DEPENDENCE, CIGARETTES, UNCOMPLICATED: ICD-10-CM

## 2023-05-04 DIAGNOSIS — E78.1 PURE HYPERGLYCERIDEMIA: ICD-10-CM

## 2023-05-04 DIAGNOSIS — I10 ESSENTIAL (PRIMARY) HYPERTENSION: ICD-10-CM

## 2023-05-04 PROCEDURE — 93000 ELECTROCARDIOGRAM COMPLETE: CPT

## 2023-05-04 PROCEDURE — 99214 OFFICE O/P EST MOD 30 MIN: CPT | Mod: 25

## 2023-05-04 PROCEDURE — 93306 TTE W/DOPPLER COMPLETE: CPT

## 2023-05-04 RX ORDER — AMLODIPINE BESYLATE 5 MG/1
5 TABLET ORAL
Qty: 90 | Refills: 3 | Status: ACTIVE | COMMUNITY

## 2023-05-04 RX ORDER — FOLIC ACID 1 MG/1
1 TABLET ORAL
Refills: 0 | Status: ACTIVE | COMMUNITY

## 2023-05-04 RX ORDER — METFORMIN HYDROCHLORIDE 500 MG/1
500 TABLET, COATED ORAL
Qty: 180 | Refills: 5 | Status: ACTIVE | COMMUNITY
Start: 2020-10-02

## 2023-05-04 RX ORDER — GEMFIBROZIL 600 MG/1
600 TABLET, FILM COATED ORAL TWICE DAILY
Qty: 60 | Refills: 3 | Status: ACTIVE | COMMUNITY

## 2023-05-04 NOTE — PHYSICAL EXAM
[Well Developed] : well developed [Well Nourished] : well nourished [No Acute Distress] : no acute distress [Normal Conjunctiva] : normal conjunctiva [Normal Venous Pressure] : normal venous pressure [No Carotid Bruit] : no carotid bruit [Normal Rate] : normal [Rhythm Regular] : regular [Normal S1] : normal S1 [Normal S2] : normal S2 [II] : a grade 2 [Right Carotid Bruit] : right carotid bruit heard [Left Carotid Bruit] : left carotid bruit heard [Clear Lung Fields] : clear lung fields [Good Air Entry] : good air entry [No Respiratory Distress] : no respiratory distress  [Soft] : abdomen soft [Non Tender] : non-tender [No Masses/organomegaly] : no masses/organomegaly [Normal Bowel Sounds] : normal bowel sounds [Normal Gait] : normal gait [Gait - Sufficient for Exercise Testing] : gait - sufficient for exercise testing [Click] : no click [Distant] : the heart sounds were ~L not distant

## 2023-05-04 NOTE — ADDENDUM
[FreeTextEntry1] : I, Puneet Knight, assisted in documentation on 05/04/2023 acting as a scribe for Dr. Nash Taylor.\par \par

## 2023-05-04 NOTE — DISCUSSION/SUMMARY
[Hypertriglyceridemia] : essential hypertriglyceridemia [Stable] : stable [Continue] : continuing fibric acid derivative [Diet Modification] : diet modification [Smoking Cessation] : smoking cessation [Exercise] : exercise [Patient] : the patient [FreeTextEntry1] : Dilated TAA no new changes \par Ct chest pending

## 2023-05-04 NOTE — ASSESSMENT
[FreeTextEntry1] : Lvef 55% Mild dilated aorta 4.0-4.2 cm.. dilated sinus of Valsalva\par \par Risk factor modification discussed \par smoking cessation diet / exercise \par Consider use of ACE in light of recent Dx of AODM\par UA neg for protien \par \par target lean body wgt \par consider adding AS 81 mg if no contraindication. \par

## 2023-05-04 NOTE — HISTORY OF PRESENT ILLNESS
[FreeTextEntry1] : 10/2/2020 Patient is a 50-year-old white male with past medical history of hypertension elevated triglycerides(1200 recent 300s) and recently diagnosed with diabetes mellitus. He has been suffering for some time with lower extremity neuropathy which has limited his ambulation. Patient is also a 1 pack/day smoker. Significant family history is notable for a family history of thrombophilia with sisters and brothers who have both had DVTs and large pulmonary embolus some of them fatal in nature. His sister was tested and has factor IV deficiency he had a work-up for thrombophilia which apparently was negative by report. Patient had a stress test approximately 2 years ago which was negative for ischemic disease. He presents for follow-up for known history of mitral valve disease as well as an enlarged aorta. 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

## 2025-04-24 ENCOUNTER — RX RENEWAL (OUTPATIENT)
Age: 55
End: 2025-04-24

## 2025-04-25 ENCOUNTER — RX RENEWAL (OUTPATIENT)
Age: 55
End: 2025-04-25

## 2025-05-09 ENCOUNTER — APPOINTMENT (OUTPATIENT)
Dept: HEART AND VASCULAR | Facility: CLINIC | Age: 55
End: 2025-05-09
Payer: MEDICAID

## 2025-05-09 ENCOUNTER — NON-APPOINTMENT (OUTPATIENT)
Age: 55
End: 2025-05-09

## 2025-05-09 VITALS
WEIGHT: 214 LBS | HEART RATE: 87 BPM | SYSTOLIC BLOOD PRESSURE: 140 MMHG | BODY MASS INDEX: 28.36 KG/M2 | HEIGHT: 73 IN | DIASTOLIC BLOOD PRESSURE: 96 MMHG

## 2025-05-09 DIAGNOSIS — I77.810 THORACIC AORTIC ECTASIA: ICD-10-CM

## 2025-05-09 PROCEDURE — 99214 OFFICE O/P EST MOD 30 MIN: CPT

## 2025-05-09 PROCEDURE — 93306 TTE W/DOPPLER COMPLETE: CPT

## 2025-05-09 PROCEDURE — 99214 OFFICE O/P EST MOD 30 MIN: CPT | Mod: 25

## 2025-05-09 PROCEDURE — 93000 ELECTROCARDIOGRAM COMPLETE: CPT

## 2025-05-09 RX ORDER — ERGOCALCIFEROL 1.25 MG/1
50000 CAPSULE ORAL
Refills: 0 | Status: ACTIVE | COMMUNITY

## 2025-05-09 RX ORDER — PREGABALIN 50 MG/1
50 CAPSULE ORAL 3 TIMES DAILY
Refills: 0 | Status: ACTIVE | COMMUNITY

## 2025-05-09 RX ORDER — CIPROFLOXACIN HYDROCHLORIDE 500 MG/1
500 TABLET, FILM COATED ORAL
Qty: 20 | Refills: 0 | Status: ACTIVE | COMMUNITY

## 2025-05-09 RX ORDER — NAPROXEN 500 MG/1
500 TABLET ORAL DAILY
Refills: 0 | Status: ACTIVE | COMMUNITY

## 2025-05-09 RX ORDER — ASCORBIC ACID 500 MG
500 TABLET ORAL DAILY
Refills: 0 | Status: ACTIVE | COMMUNITY